# Patient Record
Sex: FEMALE | Race: WHITE | NOT HISPANIC OR LATINO | Employment: FULL TIME | URBAN - METROPOLITAN AREA
[De-identification: names, ages, dates, MRNs, and addresses within clinical notes are randomized per-mention and may not be internally consistent; named-entity substitution may affect disease eponyms.]

---

## 2017-06-16 ENCOUNTER — CONVERSION ENCOUNTER (OUTPATIENT)
Dept: GENERAL RADIOLOGY | Facility: HOSPITAL | Age: 42
End: 2017-06-16

## 2019-02-22 ENCOUNTER — HOSPITAL ENCOUNTER (OUTPATIENT)
Dept: GENERAL RADIOLOGY | Facility: HOSPITAL | Age: 44
Discharge: HOME | End: 2019-02-22
Attending: OBSTETRICS & GYNECOLOGY

## 2021-01-25 ENCOUNTER — HOSPITAL ENCOUNTER (OUTPATIENT)
Dept: OTHER | Facility: HOSPITAL | Age: 46
Discharge: HOME | End: 2021-01-25
Attending: INTERNAL MEDICINE

## 2021-02-18 ENCOUNTER — HOSPITAL ENCOUNTER (OUTPATIENT)
Dept: VACCINE CLINIC | Facility: HOSPITAL | Age: 46
Discharge: HOME | End: 2021-02-18
Attending: INTERNAL MEDICINE

## 2021-07-28 ENCOUNTER — TRANSCRIBE ORDERS (OUTPATIENT)
Dept: ADMINISTRATIVE | Facility: HOSPITAL | Age: 46
End: 2021-07-28

## 2021-07-28 DIAGNOSIS — Z12.31 ENCOUNTER FOR SCREENING MAMMOGRAM FOR MALIGNANT NEOPLASM OF BREAST: Primary | ICD-10-CM

## 2021-09-28 ENCOUNTER — HOSPITAL ENCOUNTER (OUTPATIENT)
Dept: MAMMOGRAPHY | Facility: HOSPITAL | Age: 46
Discharge: HOME | End: 2021-09-28
Admitting: OBSTETRICS & GYNECOLOGY

## 2021-09-28 DIAGNOSIS — Z12.31 ENCOUNTER FOR SCREENING MAMMOGRAM FOR MALIGNANT NEOPLASM OF BREAST: ICD-10-CM

## 2021-09-28 PROCEDURE — 77063 BREAST TOMOSYNTHESIS BI: CPT

## 2021-09-28 PROCEDURE — 77067 SCR MAMMO BI INCL CAD: CPT

## 2021-10-01 ENCOUNTER — OFFICE VISIT (OUTPATIENT)
Dept: OBSTETRICS AND GYNECOLOGY | Facility: CLINIC | Age: 46
End: 2021-10-01

## 2021-10-01 VITALS
WEIGHT: 159 LBS | DIASTOLIC BLOOD PRESSURE: 86 MMHG | SYSTOLIC BLOOD PRESSURE: 130 MMHG | BODY MASS INDEX: 28.17 KG/M2 | HEIGHT: 63 IN

## 2021-10-01 DIAGNOSIS — N90.89 VULVAR LESION: Primary | ICD-10-CM

## 2021-10-01 DIAGNOSIS — Z01.419 ENCOUNTER FOR WELL WOMAN EXAM WITH ROUTINE GYNECOLOGICAL EXAM: ICD-10-CM

## 2021-10-01 PROCEDURE — 99396 PREV VISIT EST AGE 40-64: CPT | Performed by: OBSTETRICS & GYNECOLOGY

## 2021-10-01 PROCEDURE — G0148 SCR C/V CYTO, AUTOSYS, RESCR: HCPCS | Performed by: OBSTETRICS & GYNECOLOGY

## 2021-10-01 PROCEDURE — 87624 HPV HI-RISK TYP POOLED RSLT: CPT | Performed by: OBSTETRICS & GYNECOLOGY

## 2021-10-01 RX ORDER — LEVOTHYROXINE SODIUM 88 MCG
88 TABLET ORAL DAILY
COMMUNITY
Start: 2021-08-19

## 2021-10-01 NOTE — PROGRESS NOTES
"Well Woman Visit    CC: Annual well woman exam     Myriad intake: No     Tobacco/Nicotine use:  No      HPI:   46 y.o. Contraception or HRT: None. Doesn't want BC.  Menses:   q 28 days, lasts 5 days, changes products q 4-5 hrs on heaviest days.   Pain:  None    Pt has no complaints today.    History: PMHx, Meds, Allergies, PSHx, Social Hx, and POBHx all reviewed and updated.  PCP: does NOT perform preventative labs      PHYSICAL EXAM:  /86   Ht 158.8 cm (62.5\")   Wt 72.1 kg (159 lb)   LMP 2021   BMI 28.62 kg/m²   General- NAD, alert and oriented, appropriate  Psych- Normal mood, good memory  Neck- No masses, no thyroid enlargement  CV- Regular rhythm, no murnurs  Resp- CTA to bases, no wheezes  Abdomen- Soft, non distended, non tender, no masses    Breast left-  Bilaterally symmetrical, no masses, non tender, no nipple discharge  Breast right- Bilaterally symmetrical, no masses, non tender, no nipple discharge    External genitalia- Normal female, R LABIA INNER MINORA W 8MM BROWN/BLACK LESION  Urethra/meatus- Normal, no masses, non tender, no prolapse  Bladder- Normal, no masses, non tender, no prolapse  Vagina- Normal, no atrophy, no lesions, no discharge.    Cvx- Normal, no lesions, no discharge, No cervical motion tenderness  Uterus- Normal size, shape & consistency.  Non tender, mobile, & no prolapse  Adnexa- No mass, non tender  Anus/Rectum/Perineum- Normal appearance, no mass, good sphincter tone, no hemorrhoids, no prolapse     Lymphatic- No palpable neck, axillary, or groin nodes  Ext- No edema, no cyanosis    Skin- No lesions, no rashes, no acanthosis nigricans        ASSESSMENT and PLAN:  WWE    Diagnoses and all orders for this visit:    1. Vulvar lesion (Primary)    2. Encounter for well woman exam with routine gynecological exam  -     IgP, Aptima HPV          Preventative:   • She understands the importance of having any ordered tests to be performed in a timely fashion.  The risks " of not performing them include, but are not limited to, advanced cancer stages, bone loss from osteoporosis and/or subsequent increase in morbidity and/or mortality.  She is encouraged to review her results online and/or contact or office if she has questions.     • BREAST HEALTH- Monthly self breast exam importance and how to reviewed. MMG and/or MRI (prn) reviewed per society guidelines and her individual history. Screen: Already up to date.  • CERVICAL CANCER SCREENING- Reviewed current ASCCP guidelines for screening w and wo cotest HPV, age specific.  Screen: Updated today.  • COLON CANCER SCREENING- Reviewed current medical society guidelines and options.  Screen:  RECOMMENDED..  • Importance of WEIGHT MANAGEMENT, nutrition, and exercise reviewed.  • VACCINATIONS RECOMMENDED: COVID and booster PRN, Flu annually, Tdap [id number].  Importance discussed, risk being unvaccinated reviewed.  Questions answered  • Smoking status- NON SMOKER.  Importance of avoiding second hand smoke.  • Follow up PCP/Specialist PMHx and Labs    • MYRIAD: Qualifies for testing. Counseled and evaluated for genetic testing. Pt will check w insurance and RTO if desires.      Follow Up:  Return in about 1 month (around 11/1/2021) for VULVAR BIOPSY.          Nanette Mason, DO  10/01/2021    [facility] [place] [facility]  [facility] [place] [address]  Dept: [phone]  Dept Fax: [phone]  Loc: [phone]  Loc Fax: [phone]

## 2021-10-06 LAB
CYTOLOGIST CVX/VAG CYTO: NORMAL
CYTOLOGY CVX/VAG DOC CYTO: NORMAL
CYTOLOGY CVX/VAG DOC THIN PREP: NORMAL
DX ICD CODE: NORMAL
HIV 1 & 2 AB SER-IMP: NORMAL
HPV I/H RISK 4 DNA CVX QL PROBE+SIG AMP: NEGATIVE
OTHER STN SPEC: NORMAL
STAT OF ADQ CVX/VAG CYTO-IMP: NORMAL

## 2021-11-08 ENCOUNTER — OFFICE VISIT (OUTPATIENT)
Dept: OBSTETRICS AND GYNECOLOGY | Facility: CLINIC | Age: 46
End: 2021-11-08

## 2021-11-08 VITALS
WEIGHT: 159 LBS | BODY MASS INDEX: 28.62 KG/M2 | HEART RATE: 90 BPM | DIASTOLIC BLOOD PRESSURE: 69 MMHG | SYSTOLIC BLOOD PRESSURE: 109 MMHG

## 2021-11-08 DIAGNOSIS — N90.89 VULVAR LESION: Primary | ICD-10-CM

## 2021-11-08 PROCEDURE — 88305 TISSUE EXAM BY PATHOLOGIST: CPT | Performed by: OBSTETRICS & GYNECOLOGY

## 2021-11-08 PROCEDURE — 56605 BIOPSY OF VULVA/PERINEUM: CPT | Performed by: OBSTETRICS & GYNECOLOGY

## 2021-11-08 NOTE — PROGRESS NOTES
Procedure Note      Chief Complaint   Patient presents with   • Biopsy       Consent signed: Yes  Local lidocaine jelly placed:  Yes      Procedure was discussed.  She understand the benefits, risks, and alternatives.  The potential risks are not limited to pain, bleeding, and/or infection.  All her questions have been answered to her satisfaction and she desires the procedure.    HPI: Lesion noted on WWE    PHYSICAL EXAM:  /69   Pulse 90   Wt 72.1 kg (159 lb)   LMP 10/20/2021   BMI 28.62 kg/m²     Physical Exam  Genitourinary:         Comments: Black/brown flat.  Darkest area biopsied.        Biopsy/Lesion excision:  After consent betadine or hibiclens (if betadine allergy) used   Local anesthetic wo epi placed  Specimen/s sent to path    • Punch biopsy 4mm    Patient tolerated the procedure well.     ASSESSMENT AND PLAN:   Diagnoses and all orders for this visit:    1. Vulvar lesion (Primary)  -     Biopsy Vulva  -     Tissue Pathology Exam      Counseling:   Post procedure precautions: bleeding, infection, pain.  Care of area reviewed, keep clean and dry.  Avoid intercourse or tub bath/pool until healed completely.  OTC meds prn discomfort.  If biopsy/lesion excision performed:  we will call with results, if pt has not heard from our office in 7-10days, pt agrees to call.  I have also encourage online retrieval of results.    Follow Up:  Return bx results.      Nanette Mason DO  11/08/2021    [facility] [place] [facility]  [facility] [place] [address]  Dept: [phone]  Dept Fax: [phone]  Loc: [phone]  Loc Fax: [phone]

## 2021-11-18 LAB
CYTO UR: NORMAL
LAB AP CASE REPORT: NORMAL
LAB AP CLINICAL INFORMATION: NORMAL
PATH REPORT.FINAL DX SPEC: NORMAL
PATH REPORT.GROSS SPEC: NORMAL

## 2023-01-17 ENCOUNTER — TELEPHONE (OUTPATIENT)
Dept: OBSTETRICS AND GYNECOLOGY | Facility: CLINIC | Age: 48
End: 2023-01-17
Payer: COMMERCIAL

## 2023-01-17 ENCOUNTER — TRANSCRIBE ORDERS (OUTPATIENT)
Dept: ADMINISTRATIVE | Facility: HOSPITAL | Age: 48
End: 2023-01-17
Payer: COMMERCIAL

## 2023-01-17 DIAGNOSIS — Z12.31 VISIT FOR SCREENING MAMMOGRAM: Primary | ICD-10-CM

## 2023-01-17 NOTE — TELEPHONE ENCOUNTER
Patient contacted and advised that the order would be placed when she comes in for her appointment.

## 2023-01-17 NOTE — TELEPHONE ENCOUNTER
Caller: Anette Lerma    Relationship: Self    Best call back number: [phone]    What orders are you requesting (i.e. lab or imaging): MAMMOGRAM ORDER    In what timeframe would the patient need to come in: ASAP    Where will you receive your lab/imaging services: [facility]    Additional notes: PT CALLING TODAY TO SCHEDULE, DOES NOT NEED CALL BACK.

## 2023-03-13 NOTE — PROGRESS NOTES
"Well Woman Visit    CC: Scheduled annual well gyn visit  Chief Complaint   Patient presents with   • Gynecologic Exam     Disc menopause       Myriad intake in the past?: no  QUALIFIED TODAY    HPI:   47 y.o.   Social History     Substance and Sexual Activity   Sexual Activity Yes   • Partners: Male   • Birth control/protection: None     Pap smear 2021 negative and HPV negative    Menses changing, Dec 2022 very heavy changing [id number].  Last two cycles since not as heavy, back to normal flow.      Menses:   q 23-25 days, lasts 5 days, changes products q 3hrs on heaviest days.   Pain with menses:  None     No hot flashes or night sweats.  Occas more hot at night.  Mother passed early, unsure re age of menopause.     PCP: does manage PMHx and preventative labs  History: PMHx sig for known uterine fibroid ultrasound  2 cm, Meds, Allergies, PSHx, Social Hx, and POBHx all reviewed and updated.    PHYSICAL EXAM:  /79   Pulse 71   Ht 158.8 cm (62.5\")   Wt 76.8 kg (169 lb 6.4 oz)   LMP 2023 (Exact Date)   BMI 30.49 kg/m²  Not found.  General- NAD, alert and oriented, appropriate  Psych- Normal mood, good memory  Neck- No masses, no thyroid enlargement  CV- Regular rhythm, no murnurs  Resp- CTA to bases, no wheezes  Abdomen- Soft, non distended, non tender, no masses    Breast left-  Bilaterally symmetrical, no masses, non tender, no nipple discharge, no axillary or supraclavicular nodes palpable  Breast right- Bilaterally symmetrical, no masses, non tender, no nipple discharge, no axillary or supraclavicular nodes palpable    External genitalia- Normal female, no lesions  Urethra/meatus- Normal, no masses, non tender  Bladder- Normal, no masses, non tender  Vagina- Normal, no atrophy, no lesions2, no discharge.  Prolapse: none noted, not examined with split speculum to delineate  Cvx- Normal, no lesions, no discharge, No cervical motion tenderness  Uterus- Normal size, slightly irreg " shape cw known fibroid & consistency.  Non tender, mobile, & no prolapse  Adnexa- No mass, non tender  Anus/Rectum/Perineum- Normal appearance, no mass, good sphincter tone, no hemorrhoids, no prolapse    Lymphatic- No palpable neck, axillary, or groin nodes  Ext- No edema, no cyanosis    Skin- No lesions, no rashes, no acanthosis nigricans      ASSESSMENT and PLAN:    Diagnoses and all orders for this visit:    1. Well woman exam (Primary)  -     Mammo Screening Digital Tomosynthesis Bilateral With CAD; Future    2. Family history of breast cancer        Preventative:  • BREAST HEALTH- Monthly self breast exam importance and how to reviewed. MMG and/or MRI (prn) reviewed per society guidelines and her individual history. Screen: Updated today  • CERVICAL CANCER Screening- Reviewed current ASCCP guidelines for screening w and wo cotest HPV, age specific.  Screen: Already up to date  • COLON CANCER Screening- Reviewed current medical society guidelines and options.  Screen:  Already up to date, s/p Cologuard <3yrs ago  • Importance of WEIGHT MANAGEMENT, nutrition, and exercise reviewed  • BONE HEALTH- Reviewed current medical society guidelines and options for testing, calcium and vit D intake.  Weight bearing exercise.  DEXA: Not medically needed  • VACCINATIONS Recommended: COVID and booster PRN, Flu annually, Tdap [id number], Zoster (49yo and older).  Importance discussed, risk being unvaccinated reviewed.  Questions answered  • Smoking status- NON SMOKER  • Follow up PCP/Specialist PMHx and Labs  MYRIAD: Qualifies for testing. Counseled and evaluated for genetic testing. She plans to check with her insurance and will return if desires.  TRACK MENSES, RTO if <q21d, >7d long, heavy or painful.      She understands the importance of having any ordered tests to be performed in a timely fashion.  The risks of not performing them include, but are not limited to, advanced cancer stages, bone loss from osteoporosis and/or  subsequent increase in morbidity and/or mortality.  She is encouraged to review her results online and/or contact or office if she has questions.     Follow Up:  Return in about 1 year (around 3/14/2024) for WWE, sooner PRN  sxs or issues w menses or if desires myriad.            Nanette Mason, DO  03/14/2023    [facility] [place] [facility]  [facility] [place] [address]  Dept: [phone]  Dept Fax: [phone]  Loc: [phone]  Loc Fax: [phone]

## 2023-03-14 ENCOUNTER — OFFICE VISIT (OUTPATIENT)
Dept: OBSTETRICS AND GYNECOLOGY | Facility: CLINIC | Age: 48
End: 2023-03-14
Payer: COMMERCIAL

## 2023-03-14 VITALS
HEIGHT: 63 IN | BODY MASS INDEX: 30.02 KG/M2 | HEART RATE: 71 BPM | WEIGHT: 169.4 LBS | DIASTOLIC BLOOD PRESSURE: 79 MMHG | SYSTOLIC BLOOD PRESSURE: 121 MMHG

## 2023-03-14 DIAGNOSIS — Z01.419 WELL WOMAN EXAM: Primary | ICD-10-CM

## 2023-03-14 DIAGNOSIS — Z80.3 FAMILY HISTORY OF BREAST CANCER: ICD-10-CM

## 2023-03-14 PROCEDURE — 99396 PREV VISIT EST AGE 40-64: CPT | Performed by: OBSTETRICS & GYNECOLOGY

## 2023-03-14 RX ORDER — LEVOTHYROXINE SODIUM 75 MCG
TABLET ORAL
COMMUNITY
Start: 2023-02-15

## 2023-04-13 ENCOUNTER — HOSPITAL ENCOUNTER (OUTPATIENT)
Dept: MAMMOGRAPHY | Facility: HOSPITAL | Age: 48
Discharge: HOME | End: 2023-04-13
Admitting: OBSTETRICS & GYNECOLOGY
Payer: COMMERCIAL

## 2023-04-13 DIAGNOSIS — Z12.31 VISIT FOR SCREENING MAMMOGRAM: ICD-10-CM

## 2023-04-13 PROCEDURE — 77067 SCR MAMMO BI INCL CAD: CPT

## 2023-04-13 PROCEDURE — 77063 BREAST TOMOSYNTHESIS BI: CPT

## 2023-12-07 ENCOUNTER — TELEPHONE (OUTPATIENT)
Dept: OBSTETRICS AND GYNECOLOGY | Facility: CLINIC | Age: 48
End: 2023-12-07
Payer: COMMERCIAL

## 2024-03-11 ENCOUNTER — TRANSCRIBE ORDERS (OUTPATIENT)
Dept: ADMINISTRATIVE | Facility: HOSPITAL | Age: 49
End: 2024-03-11
Payer: COMMERCIAL

## 2024-03-11 DIAGNOSIS — Z12.31 ENCOUNTER FOR SCREENING MAMMOGRAM FOR BREAST CANCER: Primary | ICD-10-CM

## 2024-03-11 NOTE — PROGRESS NOTES
"Well Woman Visit    CC: Scheduled annual well gyn visit  Chief Complaint   Patient presents with    Gynecologic Exam       HPI:   48 y.o.   Social History     Substance and Sexual Activity   Sexual Activity Yes    Partners: Male    Birth control/protection: Condom     Office Visit with Nantete Mason DO (2023)  Hugo Boo HPV (10/01/2021 17:11) Pap negative HPV negative  Mammo Screening Digital Tomosynthesis Bilateral With CAD (2023 15:18)    Known uterine fibroid, ultrasound 2012, 2 cm  Menses:   q mo, lasts 4-5 days, changes products q 2hrs on heaviest days.   Pain with menses:  None    Pt has no complaints today.    PCP: does manage PMHx and preventative labs  History: PMHx, Meds, Allergies, PSHx, Social Hx, and POBHx all reviewed and updated.    PHYSICAL EXAM:  /79   Pulse 81   Ht 158.8 cm (62.52\")   Wt 74.5 kg (164 lb 3.2 oz)   LMP 2024 (Exact Date)   BMI 29.54 kg/m²  Not found.   General- NAD, alert and oriented, appropriate  Psych- Normal mood, good memory  Neck- No masses, no thyroid enlargement  CV- Regular rhythm, no murmurs  Resp- CTA to bases, no wheezes  Abdomen- Soft, non distended, non tender, no masses    Chaperone present during breast and/or pelvic exam if performed.  Breast left-  Bilaterally symmetrical, no masses, non tender, no nipple discharge, no axillary or supraclavicular nodes palpable.    Breast right- Bilaterally symmetrical, no masses, non tender, no nipple discharge, no axillary or supraclavicular nodes palpable.      External genitalia- Normal female, no lesions  Urethra/meatus- Normal, no masses, non tender  Bladder- Normal, no masses, non tender  Vagina- Normal, no atrophy, no lesions, no discharge.    Prolapse : none noted, not examined with split speculum to delineate  Cvx- Normal, no lesions, no discharge, No cervical motion tenderness  Uterus-  c/w known fibroid, Enlarged, Irregular contour, 12week size, mobile  Adnexa- No mass, non " tender  Anus/Rectum/Perineum- Normal appearance, no mass, good sphincter tone, WITH small hemorrhoids, no prolapse    Lymphatic- No palpable neck, axillary, or groin nodes  Ext- No edema, no cyanosis    Skin- No lesions, no rashes, no acanthosis nigricans      ASSESSMENT and PLAN:    Diagnoses and all orders for this visit:    1. Well woman exam (Primary)  -     Mammo Screening Digital Tomosynthesis Bilateral With CAD; Future    2. Family history of breast cancer  Overview:  Mat Aunt  3/21/2024 considering hereditary cancer testing, paperwork filled out today, pt will check w aunt re more details on her hx          Preventative:  BREAST HEALTH- Monthly self breast exam importance and how to reviewed. MMG and/or MRI (prn) reviewed per society guidelines and her individual history. Screen: Updated today  CERVICAL CANCER Screening- Reviewed current ASCCP guidelines for screening w and wo cotest HPV, age specific.  Screen: Already up to date  COLON CANCER Screening- Reviewed current medical society guidelines and options.  Screen:  Already up to date  Importance of WEIGHT MANAGEMENT, nutrition, and exercise reviewed  BONE HEALTH- Reviewed current medical society guidelines and options for testing, calcium and vit D intake.  Weight bearing exercise.  DEXA: Not medically needed  VACCINATIONS Recommended: Covid vaccine, Flu annually, Tdap [id number], Zoster (49yo and older).  Importance discussed, risk being unvaccinated reviewed.  Questions answered  Smoking status- NON SMOKER/VAPER  Follow up PCP/Specialist PMHx and/or Labs    HEREDITARY CANCER SCREEN : Counseled and evaluated for hereditary cancer testing. Testing accepted. Patients information and Fhx will be sent to genetic counselor to review and discuss with patient options for testing if she qualifies.   She is to contact our office if she has not heard from the genetic counselor in the next 2 weeks.       She understands the importance of having any ordered tests to  be performed in a timely fashion.  The risks of not performing them include, but are not limited to, advanced cancer stages, bone loss from osteoporosis and/or subsequent increase in morbidity and/or mortality.  She is encouraged to review her results online and/or contact or office if she has questions.     Follow Up:  Return in about 1 year (around 3/21/2025) for WWE.            Nanette Mason,   03/21/2024    [facility]  [facility] [place] [address]  Dept: [phone]  Dept Fax: [phone]  Loc: [phone]  Loc Fax: [phone]

## 2024-03-20 PROBLEM — N90.89 VULVAR LESION: Status: RESOLVED | Noted: 2021-10-01 | Resolved: 2024-03-20

## 2024-03-21 ENCOUNTER — OFFICE VISIT (OUTPATIENT)
Dept: OBSTETRICS AND GYNECOLOGY | Facility: CLINIC | Age: 49
End: 2024-03-21
Payer: COMMERCIAL

## 2024-03-21 VITALS
BODY MASS INDEX: 29.09 KG/M2 | HEART RATE: 81 BPM | WEIGHT: 164.2 LBS | SYSTOLIC BLOOD PRESSURE: 119 MMHG | DIASTOLIC BLOOD PRESSURE: 79 MMHG | HEIGHT: 63 IN

## 2024-03-21 DIAGNOSIS — Z01.419 WELL WOMAN EXAM: Primary | ICD-10-CM

## 2024-03-21 DIAGNOSIS — Z80.3 FAMILY HISTORY OF BREAST CANCER: ICD-10-CM

## 2024-05-10 ENCOUNTER — HOSPITAL ENCOUNTER (OUTPATIENT)
Dept: MAMMOGRAPHY | Facility: HOSPITAL | Age: 49
Discharge: HOME | End: 2024-05-10
Admitting: OBSTETRICS & GYNECOLOGY
Payer: COMMERCIAL

## 2024-05-10 DIAGNOSIS — Z12.31 ENCOUNTER FOR SCREENING MAMMOGRAM FOR BREAST CANCER: ICD-10-CM

## 2024-05-10 PROCEDURE — 77063 BREAST TOMOSYNTHESIS BI: CPT

## 2024-05-10 PROCEDURE — 77067 SCR MAMMO BI INCL CAD: CPT

## 2025-03-20 NOTE — PROGRESS NOTES
"Well Woman Visit    CC: Scheduled annual well gyn visit  Chief Complaint   Patient presents with    Annual Exam     No period in February, discuss pre-menopause        HPI:   49 y.o.   Social History     Substance and Sexual Activity   Sexual Activity Yes    Partners: Male    Birth control/protection: Condom     Mammo Screening Digital Tomosynthesis Bilateral With CAD (05/10/2024 17:51)  IgP, Aptima HPV (10/01/2021 17:11) Pap negative, HPV negative  Progress Notes by Nanette Mason DO (2024 13:30)    Known uterine fibroid, ultrasound 2012, 2 cm   Last year was considering hereditary cancer testing due to maternal aunts with breast cancer still considering    Menses:   q 1-2mo, lasts 3-5 days, changes products q 1hrs on heaviest days, heavy q 2-3mo, can't leave the house.   Affects ADLs.  Has been this way for a bit over a year.    Pain with menses:  can be at times, even occas mild cramps off menses, otc meds help    Occas hot flash.  Mother and maternal aunt passed before menopause.     PCP: no recent preventative labs  History: PMHx, Meds, Allergies, PSHx, Social Hx, and POBHx all reviewed and updated.    PHYSICAL EXAM:  /72   Pulse 74   Ht 158.8 cm (62.5\")   Wt 78 kg (172 lb)   LMP 2025 (Approximate) Comment: Heavy flow, lasting about 5 days  Breastfeeding No   BMI 30.96 kg/m²  Not found.   Chaperone present during breast and/or pelvic exam if performed.  General- NAD, alert and oriented, appropriate  Psych- Normal mood, good memory  Neck- No masses, no thyroid enlargement  CV- Regular rhythm, no murmurs  Resp- CTA to bases, no wheezes  Abdomen- Soft, non distended, non tender, no masses    Breast left-  Bilaterally symmetrical, no masses, non tender, no nipple discharge, no axillary or supraclavicular nodes palpable.    Breast right- Bilaterally symmetrical, no masses, non tender, no nipple discharge, no axillary or supraclavicular nodes palpable.      External genitalia- Normal " female, no lesions  Urethra/meatus- Normal, no masses, non tender  Bladder- Normal, no masses, non tender  Vagina- Normal, no atrophy, no lesions, no discharge.    Prolapse : none noted   Cvx- Normal, no lesions, no discharge, No cervical motion tenderness  Uterus-  cw fibroids 10-12week size, mobile, Enlarged, Irregular contour  Adnexa- No mass, non tender  Anus/Rectum/Perineum- Normal appearance, no mass, good sphincter tone, WITH small hemorrhoids, no prolapse    Lymphatic- No palpable neck, axillary, or groin nodes  Ext- No edema, no cyanosis    Skin- No lesions, no rashes, no acanthosis nigricans      ASSESSMENT and PLAN:    Diagnoses and all orders for this visit:    1. Well woman exam with routine gynecological exam (Primary)  -     Mammo Screening Digital Tomosynthesis Bilateral With CAD; Future  -     Lipid Panel  -     Hemoglobin A1c    2. Birth control counseling  Comments:  condoms    3. Menorrhagia with irregular cycle  -     CBC (No Diff)  -     TSH  -     T4, Free  -     Prolactin  -     US Non-ob Transvaginal; Future    4. Dysmenorrhea  -     US Non-ob Transvaginal; Future    5. History of uterine fibroid    6. Hot flashes  -     Follicle Stimulating Hormone  -     Estradiol    7. Family history of breast cancer  Comments:  info on calling [facility] given today to schedule counseling  Overview:  Mat Aunt  3/21/2024 considering hereditary cancer testing, paperwork filled out today, pt will check w aunt re more details on her hx          Preventative:  BREAST HEALTH- Monthly self breast exam importance and how to reviewed. MMG and/or MRI (prn) reviewed per society guidelines and her individual history. Screen: Updated today  CERVICAL CANCER Screening- Reviewed current ASCCP guidelines for screening w and wo cotest HPV, age specific.  Screen: Already up to date  COLON CANCER Screening- Reviewed current medical society guidelines and options.  Screen:  Already up to date, cologuard by report  utd  Importance of WEIGHT MANAGEMENT, nutrition, and exercise reviewed.  Ideal BMI discussed  BONE HEALTH- Reviewed current medical society guidelines and options for testing, calcium and vit D intake.  Weight bearing exercise.  DEXA: Not medically needed  VACCINATIONS Recommended: Covid vaccine, Flu annually, Tdap [id number].  Importance discussed, risk being unvaccinated reviewed.  Questions answered  Smoking status- NON SMOKER/VAPER  Follow up PCP/Specialist PMHx and/or Labs          She understands the importance of having any ordered tests to be performed in a timely fashion.  The risks of not performing them include, but are not limited to, advanced cancer stages, bone loss from osteoporosis and/or subsequent increase in morbidity and/or mortality.  She is encouraged to review her results online and/or contact or office if she has questions.     Follow Up:  Return in about 4 weeks (around 4/21/2025) for FU US and labs.  AND WWE 1year..            Nanette Mason,   03/24/2025    [facility] [place] [facility]  [facility] [place] [address]  Dept: [phone]  Dept Fax: [phone]  Loc: [phone]  Loc Fax: [phone]

## 2025-03-24 ENCOUNTER — OFFICE VISIT (OUTPATIENT)
Dept: OBSTETRICS AND GYNECOLOGY | Facility: CLINIC | Age: 50
End: 2025-03-24
Payer: COMMERCIAL

## 2025-03-24 VITALS
SYSTOLIC BLOOD PRESSURE: 109 MMHG | DIASTOLIC BLOOD PRESSURE: 72 MMHG | HEART RATE: 74 BPM | WEIGHT: 172 LBS | BODY MASS INDEX: 30.48 KG/M2 | HEIGHT: 63 IN

## 2025-03-24 DIAGNOSIS — Z30.09 BIRTH CONTROL COUNSELING: ICD-10-CM

## 2025-03-24 DIAGNOSIS — Z01.419 WELL WOMAN EXAM WITH ROUTINE GYNECOLOGICAL EXAM: Primary | ICD-10-CM

## 2025-03-24 DIAGNOSIS — Z80.3 FAMILY HISTORY OF BREAST CANCER: ICD-10-CM

## 2025-03-24 DIAGNOSIS — N92.1 MENORRHAGIA WITH IRREGULAR CYCLE: ICD-10-CM

## 2025-03-24 DIAGNOSIS — N94.6 DYSMENORRHEA: ICD-10-CM

## 2025-03-24 DIAGNOSIS — R23.2 HOT FLASHES: ICD-10-CM

## 2025-03-24 DIAGNOSIS — Z86.018 HISTORY OF UTERINE FIBROID: ICD-10-CM

## 2025-03-24 LAB
CHOLEST SERPL-MCNC: 178 MG/DL (ref 0–200)
DEPRECATED RDW RBC AUTO: 48.2 FL (ref 37–54)
ERYTHROCYTE [DISTWIDTH] IN BLOOD BY AUTOMATED COUNT: 15.2 % (ref 12.3–15.4)
ESTRADIOL SERPL HS-MCNC: 30.8 PG/ML
FSH SERPL-ACNC: 9.09 MIU/ML
HBA1C MFR BLD: 5 % (ref 4.8–5.6)
HCT VFR BLD AUTO: 37.1 % (ref 34–46.6)
HDLC SERPL-MCNC: 40 MG/DL (ref 40–60)
HGB BLD-MCNC: 12.3 G/DL (ref 12–15.9)
LDLC SERPL CALC-MCNC: 118 MG/DL (ref 0–100)
LDLC/HDLC SERPL: 2.89 {RATIO}
MCH RBC QN AUTO: 28.8 PG (ref 26.6–33)
MCHC RBC AUTO-ENTMCNC: 33.2 G/DL (ref 31.5–35.7)
MCV RBC AUTO: 86.9 FL (ref 79–97)
PLATELET # BLD AUTO: 265 10*3/MM3 (ref 140–450)
PMV BLD AUTO: 11.3 FL (ref 6–12)
PROLACTIN SERPL-MCNC: 15 NG/ML (ref 4.79–23.3)
RBC # BLD AUTO: 4.27 10*6/MM3 (ref 3.77–5.28)
T4 FREE SERPL-MCNC: 1.64 NG/DL (ref 0.92–1.68)
TRIGL SERPL-MCNC: 112 MG/DL (ref 0–150)
TSH SERPL DL<=0.05 MIU/L-ACNC: 2.27 UIU/ML (ref 0.27–4.2)
VLDLC SERPL-MCNC: 20 MG/DL (ref 5–40)
WBC NRBC COR # BLD AUTO: 5.03 10*3/MM3 (ref 3.4–10.8)

## 2025-03-24 PROCEDURE — 99214 OFFICE O/P EST MOD 30 MIN: CPT | Performed by: OBSTETRICS & GYNECOLOGY

## 2025-03-24 PROCEDURE — 84146 ASSAY OF PROLACTIN: CPT | Performed by: OBSTETRICS & GYNECOLOGY

## 2025-03-24 PROCEDURE — 83001 ASSAY OF GONADOTROPIN (FSH): CPT | Performed by: OBSTETRICS & GYNECOLOGY

## 2025-03-24 PROCEDURE — 99396 PREV VISIT EST AGE 40-64: CPT | Performed by: OBSTETRICS & GYNECOLOGY

## 2025-03-24 PROCEDURE — 80061 LIPID PANEL: CPT | Performed by: OBSTETRICS & GYNECOLOGY

## 2025-03-24 PROCEDURE — 82670 ASSAY OF TOTAL ESTRADIOL: CPT | Performed by: OBSTETRICS & GYNECOLOGY

## 2025-03-24 PROCEDURE — 83036 HEMOGLOBIN GLYCOSYLATED A1C: CPT | Performed by: OBSTETRICS & GYNECOLOGY

## 2025-03-24 PROCEDURE — 84443 ASSAY THYROID STIM HORMONE: CPT | Performed by: OBSTETRICS & GYNECOLOGY

## 2025-03-24 PROCEDURE — 85027 COMPLETE CBC AUTOMATED: CPT | Performed by: OBSTETRICS & GYNECOLOGY

## 2025-03-24 PROCEDURE — 84439 ASSAY OF FREE THYROXINE: CPT | Performed by: OBSTETRICS & GYNECOLOGY

## 2025-03-24 RX ORDER — CHOLECALCIFEROL (VITAMIN D3) 25 MCG
1000 TABLET ORAL DAILY
COMMUNITY

## 2025-05-16 ENCOUNTER — HOSPITAL ENCOUNTER (OUTPATIENT)
Dept: MAMMOGRAPHY | Facility: HOSPITAL | Age: 50
Discharge: HOME | End: 2025-05-16
Admitting: OBSTETRICS & GYNECOLOGY
Payer: COMMERCIAL

## 2025-05-16 DIAGNOSIS — Z01.419 WELL WOMAN EXAM WITH ROUTINE GYNECOLOGICAL EXAM: ICD-10-CM

## 2025-05-16 PROCEDURE — 77063 BREAST TOMOSYNTHESIS BI: CPT

## 2025-05-16 PROCEDURE — 77067 SCR MAMMO BI INCL CAD: CPT

## 2025-06-09 ENCOUNTER — PATIENT MESSAGE (OUTPATIENT)
Dept: OBSTETRICS AND GYNECOLOGY | Age: 50
End: 2025-06-09
Payer: COMMERCIAL